# Patient Record
Sex: MALE | URBAN - METROPOLITAN AREA
[De-identification: names, ages, dates, MRNs, and addresses within clinical notes are randomized per-mention and may not be internally consistent; named-entity substitution may affect disease eponyms.]

---

## 2021-09-13 ENCOUNTER — OFFICE VISIT (OUTPATIENT)
Dept: URBAN - METROPOLITAN AREA CLINIC 1 | Facility: CLINIC | Age: 81
End: 2021-09-13
Payer: MEDICARE

## 2021-09-13 DIAGNOSIS — H44.522 PHTHISIS BULBI OF LEFT EYE: ICD-10-CM

## 2021-09-13 DIAGNOSIS — H52.4 PRESBYOPIA: ICD-10-CM

## 2021-09-13 DIAGNOSIS — H35.371 PUCKERING OF MACULA, RIGHT EYE: Primary | ICD-10-CM

## 2021-09-13 DIAGNOSIS — H04.123 TEAR FILM INSUFFICIENCY OF BILATERAL LACRIMAL GLANDS: ICD-10-CM

## 2021-09-13 DIAGNOSIS — Z96.1 PRESENCE OF INTRAOCULAR LENS: ICD-10-CM

## 2021-09-13 PROCEDURE — 99213 OFFICE O/P EST LOW 20 MIN: CPT | Performed by: SPECIALIST

## 2021-09-13 PROCEDURE — 76512 OPH US DX B-SCAN: CPT | Performed by: SPECIALIST

## 2021-09-13 PROCEDURE — 92134 CPTRZ OPH DX IMG PST SGM RTA: CPT | Performed by: SPECIALIST

## 2021-09-13 PROCEDURE — 92015 DETERMINE REFRACTIVE STATE: CPT | Performed by: SPECIALIST

## 2021-09-13 ASSESSMENT — INTRAOCULAR PRESSURE: OD: 14

## 2021-09-13 NOTE — IMPRESSION/PLAN
Impression: Puckering of macula, right eye: H35.371. Plan: Discussed diagnosis in detail with patient. Will continue to observe condition and or symptoms. Use of Amsler grid was explained.

## 2021-09-13 NOTE — IMPRESSION/PLAN
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  Plan: Bscan unchanged today   pt will require bscan yearly

## 2021-09-13 NOTE — IMPRESSION/PLAN
Impression: Tear film insufficiency of bilateral lacrimal glands: H04.123. Plan: Dry eyes account for the patient's complaints. There is no evidence of permanent changes to the cornea. Explained condition does not have a cure and will need artificial tears for maintenance.

## 2022-03-29 ENCOUNTER — OFFICE VISIT (OUTPATIENT)
Dept: URBAN - METROPOLITAN AREA CLINIC 1 | Facility: CLINIC | Age: 82
End: 2022-03-29
Payer: MEDICARE

## 2022-03-29 DIAGNOSIS — H35.341 MACULAR CYST, HOLE, OR PSEUDOHOLE, RIGHT EYE: Primary | ICD-10-CM

## 2022-03-29 DIAGNOSIS — H04.121 TEAR FILM INSUFFICIENCY OF RIGHT LACRIMAL GLAND: ICD-10-CM

## 2022-03-29 PROCEDURE — 99213 OFFICE O/P EST LOW 20 MIN: CPT | Performed by: SPECIALIST

## 2022-03-29 PROCEDURE — 92250 FUNDUS PHOTOGRAPHY W/I&R: CPT | Performed by: SPECIALIST

## 2022-03-29 ASSESSMENT — INTRAOCULAR PRESSURE: OD: 17

## 2022-03-29 ASSESSMENT — VISUAL ACUITY
OD: 20/25
OS: NLP

## 2022-09-28 ENCOUNTER — OFFICE VISIT (OUTPATIENT)
Dept: URBAN - METROPOLITAN AREA CLINIC 1 | Facility: CLINIC | Age: 82
End: 2022-09-28
Payer: COMMERCIAL

## 2022-09-28 DIAGNOSIS — Z96.1 PRESENCE OF INTRAOCULAR LENS: ICD-10-CM

## 2022-09-28 DIAGNOSIS — H52.4 PRESBYOPIA: ICD-10-CM

## 2022-09-28 DIAGNOSIS — H44.522 PHTHISIS BULBI OF LEFT EYE: ICD-10-CM

## 2022-09-28 DIAGNOSIS — H47.231 GLAUCOMATOUS OPTIC ATROPHY, RIGHT EYE: Primary | ICD-10-CM

## 2022-09-28 PROCEDURE — 99213 OFFICE O/P EST LOW 20 MIN: CPT | Performed by: SPECIALIST

## 2022-09-28 NOTE — IMPRESSION/PLAN
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 Plan: Discussed diagnosis in detail with patient. Patient will come in on Tuesday for a BScan and follow up.

## 2022-09-28 NOTE — IMPRESSION/PLAN
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 Plan: Patient with stable ONC OD and no evidence of glaucomatous progression. No treatment at this time. We will continue to observe closely. Patient is a glaucoma suspect but no definitive glaucoma seen.

## 2022-10-04 ENCOUNTER — TESTING ONLY (OUTPATIENT)
Dept: URBAN - METROPOLITAN AREA CLINIC 1 | Facility: CLINIC | Age: 82
End: 2022-10-04
Payer: COMMERCIAL

## 2022-10-04 DIAGNOSIS — H44.522 PHTHISIS BULBI OF LEFT EYE: Primary | ICD-10-CM

## 2022-10-04 PROCEDURE — 76512 OPH US DX B-SCAN: CPT | Performed by: SPECIALIST
